# Patient Record
(demographics unavailable — no encounter records)

---

## 2025-03-05 NOTE — PHYSICAL EXAM

## 2025-03-05 NOTE — DISCUSSION/SUMMARY
[Normal Growth] : growth [Normal Development] : development  [No Elimination Concerns] : elimination [Continue Regimen] : feeding [No Skin Concerns] : skin [Normal Sleep Pattern] : sleep [None] : no medical problems [Anticipatory Guidance Given] : Anticipatory guidance addressed as per the history of present illness section [Physical Growth and Development] : physical growth and development [Social and Academic Competence] : social and academic competence [Emotional Well-Being] : emotional well-being [Risk Reduction] : risk reduction [Violence and Injury Prevention] : violence and injury prevention [No Vaccines] : no vaccines needed [No Medications] : ~He/She~ is not on any medications [Patient] : patient [Mother] : mother [Full Activity without restrictions including Physical Education & Athletics] : Full Activity without restrictions including Physical Education & Athletics [] : The components of the vaccine(s) to be administered today are listed in the plan of care. The disease(s) for which the vaccine(s) are intended to prevent and the risks have been discussed with the caretaker.  The risks are also included in the appropriate vaccination information statements which have been provided to the patient's caregiver.  The caregiver has given consent to vaccinate. [FreeTextEntry1] :  17 year old  growing and developing well  discussed dry skin on face and skin care  Continue balanced diet with all food groups. Brush teeth twice a day with toothbrush. Recommend visit to dentist. Maintain consistent daily routines and sleep schedule. Personal hygiene, puberty, and sexual health reviewed. Risky behaviors assessed. School discussed. Limit screen time to no more than 2 hours per day. Encourage physical activity.  CBC, lipids, TSH, fT4 at lab Men[facility] and flu vaccine today  return in 6 months fro MenB #2 Return 1 year for routine well child check.

## 2025-03-05 NOTE — DISCUSSION/SUMMARY
[FreeTextEntry1] : L. deltoid: Bexsero  R. deltoid: Fluzone  Pt tolerated well.  VIS given in Greenlandic [] : The components of the vaccine(s) to be administered today are listed in the plan of care. The disease(s) for which the vaccine(s) are intended to prevent and the risks have been discussed with the caretaker.  The risks are also included in the appropriate vaccination information statements which have been provided to the patient's caregiver.  The caregiver has given consent to vaccinate.

## 2025-03-05 NOTE — HISTORY OF PRESENT ILLNESS
[Eats meals with family] : eats meals with family [Has family members/adults to turn to for help] : has family members/adults to turn to for help [Is permitted and is able to make independent decisions] : Is permitted and is able to make independent decisions [Sleep Concerns] : sleep concerns [Grade: ____] : Grade: [unfilled] [Normal Performance] : normal performance [Normal Behavior/Attention] : normal behavior/attention [Normal Homework] : normal homework [Eats regular meals including adequate fruits and vegetables] : eats regular meals including adequate fruits and vegetables [Drinks non-sweetened liquids] : drinks non-sweetened liquids  [Calcium source] : calcium source [Has friends] : has friends [At least 1 hour of physical activity a day] : at least 1 hour of physical activity a day [Has interests/participates in community activities/volunteers] : has interests/participates in community activities/volunteers. [Mother] : mother [Has concerns about body or appearance] : does not have concerns about body or appearance [Uses electronic nicotine delivery system] : does not use electronic nicotine delivery system [Exposure to electronic nicotine delivery system] : no exposure to electronic nicotine delivery system [Uses tobacco] : does not use tobacco [Exposure to tobacco] : no exposure to tobacco [Uses drugs] : does not use drugs  [Exposure to drugs] : no exposure to drugs [Drinks alcohol] : does not drink alcohol [Exposure to alcohol] : no exposure to alcohol [CRASHAKIRAT Score: ___] : [unfilled] [No] : No cigarette smoke exposure [Has peer relationships free of violence] : has peer relationships free of violence [Yes] : Patient has had sexual intercourse. [Has ways to cope with stress] : has ways to cope with stress [Displays self-confidence] : displays self-confidence [Has problems with sleep] : does not have problems with sleep [Gets depressed, anxious, or irritable/has mood swings] : does not get depressed, anxious, or irritable/has mood swings [With Teen] : teen [de-identified] : will be going to [employer], will run track in college [de-identified] : runs track [de-identified] : sexually active with his girlfriend. uses condoms for protection.   [NO] : No [FreeTextEntry1] :  Social Determinants of Health domains were screened and scored.

## 2025-03-05 NOTE — RISK ASSESSMENT

## 2025-03-11 NOTE — CONSULT LETTER
[FreeTextEntry1] : Dear Dr. DONITA BOOTH,      I had the pleasure of seeing CHINA TIERNEY for follow up today.  Below is my note regarding the office visit today.      Thank you so very much for allowing me to participate in CHINA's care.  Please don't hesitate to call me should any questions or issues arise.         Sincerely,     Chandler Salguero MD, FACS, FSPU    Chief, Pediatric Urology     Professor of Urology and Pediatrics     [employer] [employer]         President, [employer] U[employer] Association - [place] Section    Past-President, Societies [employer] Pediatric U[employer]

## 2025-03-11 NOTE — PHYSICAL EXAM
[TextBox_92] : PENIS: Straight protuberant penis.  Meatus ample size in orthotopic position.   SCROTUM: Symmetric testes in dependent position without palpable mass, hernia, hydrocele Left grade 2-3 varicocele

## 2025-03-11 NOTE — ASSESSMENT
[FreeTextEntry1] : CHINA has a moderate sized varicocele with symmetric sized testes. I recommended a series of semen analyses so that a more objective parameter can be used to determine whether there is need for surgical intervention.  I described the test and provided prescriptions for them.  We will review the results and then reconvene. All questions were answered

## 2025-03-11 NOTE — DATA REVIEWED
[FreeTextEntry1] : EXAMINATION: US SCROTUM DOS TODAY'S VISIT     FINDINGS: LEFT VARICOCELE WITH SYMMETRIC TESTES WITH NORMAL FLOW; UNREMARKABLE OTHER SCROTAL CONTENTS.

## 2025-03-11 NOTE — HISTORY OF PRESENT ILLNESS
[TextBox_4] : CHINA presents today for a follow-up visit. At initial consultation with Dr. Zhao Salguero (3/2024), with mild left grade 2 varicocele upon exam. In-office ultrasound demonstrated left varicocele and findings consistent with a previously torsed right appendage. Returns for repeat ultrasounds and reexamination. No reported interval urologic issues.

## 2025-03-11 NOTE — CONSULT LETTER
[FreeTextEntry1] : Dear Dr. DONITA BOOTH,      I had the pleasure of seeing CHINA TIERNEY for follow up today.  Below is my note regarding the office visit today.      Thank you so very much for allowing me to participate in CHINA's care.  Please don't hesitate to call me should any questions or issues arise.         Sincerely,     Chandler Salguero MD, FACS, FSPU    Chief, Pediatric Urology     Professor of Urology and Pediatrics     [facility] [employer]         President, American U[employer] Association - [place] Section    Past-President, Societies [employer] Pediatric U[employer]

## 2025-03-11 NOTE — CONSULT LETTER
[FreeTextEntry1] : Dear Dr. DONITA BOOTH,      I had the pleasure of seeing CHINA TIERNEY for follow up today.  Below is my note regarding the office visit today.      Thank you so very much for allowing me to participate in CHINA's care.  Please don't hesitate to call me should any questions or issues arise.         Sincerely,     Chandler Salguero MD, FACS, FSPU    Chief, Pediatric Urology     Professor of Urology and Pediatrics     [employer] [employer] [facility] [employer]         President, [employer] Association - [place] Section    Past-President, Societies [employer] Pediatric U[employer]